# Patient Record
Sex: FEMALE | Race: WHITE | Employment: FULL TIME | URBAN - METROPOLITAN AREA
[De-identification: names, ages, dates, MRNs, and addresses within clinical notes are randomized per-mention and may not be internally consistent; named-entity substitution may affect disease eponyms.]

---

## 2018-09-20 ENCOUNTER — HOSPITAL ENCOUNTER (OUTPATIENT)
Dept: PHYSICAL THERAPY | Age: 28
Discharge: HOME | End: 2018-09-20
Payer: COMMERCIAL

## 2018-09-20 PROCEDURE — 97161 PT EVAL LOW COMPLEX 20 MIN: CPT

## 2018-09-20 PROCEDURE — 97110 THERAPEUTIC EXERCISES: CPT

## 2018-09-20 NOTE — PROGRESS NOTES
[address] [facility] PHYSICAL THERAPY 
   [address], [address] [facility] Phone: [phone]  Fax: [phone] PLAN OF CARE / STATEMENT OF MEDICAL NECESSITY FOR PHYSICAL THERAPY SERVICES Patient Name: Dalia Moran : 1990 Medical  
Diagnosis: R knee Pain Treatment Diagnosis: Right knee pain [M25.561] Onset Date: Chronic Referral Source: Erin Mason MD Start of Care [facility]): 2018 Prior Hospitalization: See medical history Provider #: [id number] Prior Level of Function: No difficulty with walking, standing, working or exercising Comorbidities: None Medications: Verified on Patient Summary List  
The Plan of Care and following information is based on the information from the initial evaluation.  
=========================================================================================== Assessment / key information:  Patient is a 29year old female presenting to therapy with signs and symptoms consistent with R knee patellofemoral pain. Patient states her symptoms began insidiously about 2 months ago while at work. Patient reports she can not recall any specific incident which brought it on, however by the end of her shift noticed an increase in pain. Ever since this time she has been experiencing more soreness with daily activities. Patient did receive an x-ray which she was told showed some problems with her knee cap. Patient reports increased difficulty with walking, standing, work and exercise activities. Patient notes symptoms feel better with rst. Patient rates pain at worst 8/10 and 1/10 at best. 
 Objective Data: Inspection-No significant gait deficits noted. B knee AROM WNL and pain free bilaterally. Strength Testing: Hip ext R 4-/5, L 4-/5; abd R 4-/5, L 4-/5. Bodyweight squat: mechanics WNL with no reproduction of symptoms.  SL squat: increased knee valgus angulation, mild heel rise R>L, mild increase in soreness. SL glute bridge isometric: unable to maintain proper alignment >10 seconds. Flexibility Testing: no restriction to B quadriceps. FOTO: 33/100. Rogers Wang Patient educated on diagnosis, prognosis, POC and HEP. Patient issued copy of HEP and denied additional questions. Patient will benefit from skilled PT in order to address these impairments and functional limitations. =========================================================================================== Eval Complexity: History LOW Complexity : Zero comorbidities / personal factors that will impact the outcome / POC;  Examination  MEDIUM Complexity : 3 Standardized tests and measures addressing body structure, function, activity limitation and / or participation in recreation ; Presentation MEDIUM Complexity : Evolving with changing characteristics ; Decision Making MEDIUM Complexity : FOTO score of 26-74; Overall Complexity LOW Problem List: pain affecting function, decrease strength, decrease ADL/ functional abilitiies and decrease activity tolerance Treatment Plan may include any combination of the following: Therapeutic exercise, Therapeutic activities, Neuromuscular re-education, Physical agent/modality, Manual therapy, Patient education and Functional mobility training Patient / Family readiness to learn indicated by: asking questions, trying to perform skills and interestPersons(s) to be included in education: patient (P) Barriers to Learning/Limitations: no 
Measures taken:   
Patient Goal (s): Eliminate pain Patient self reported health status: good Rehabilitation Potential: good ? Long Term Goals: To be accomplished in  2  weeks: 1. Patient will demonstrate independence with therapy program in order to perform exercises independently and self manage symptoms. 2. Patient will report reduction in pain at worst to 4-5/10 in order to improve standing tolerance. Frequency / Duration:   Patient to be seen  1-2  times per week for 2  weeks: 
Patient / Caregiver education and instruction: self care, activity modification and exercises G-Codes (GP): radha Therapist Signature: Edith Martinez PT Date: 9/20/2018 Certification Period: na Time: 12:45 PM  
=========================================================================================== I certify that the above Physical Therapy Services are being furnished while the patient is under my care. I agree with the treatment plan and certify that this therapy is necessary. Physician Signature:       Date:      Time:  Please sign and return to [facility] [place] or you may fax the signed copy to [phone]. Thank you.

## 2018-09-20 NOTE — PROGRESS NOTES
PHYSICAL THERAPY - DAILY TREATMENT NOTE Patient Name: Dalia Moran        Date: 2018 : 1990   YES Patient  Verified Visit #:   1   of   4  Insurance: Payor: /   
 
In time: 255 Out time:  Total Treatment Time: 25 Medicare Time Tracking (below) Total Timed Codes (min):  na 1:1 Treatment Time:  na  
TREATMENT AREA =  Right knee pain [M25.561] SUBJECTIVE Pain Level (on 0 to 10 scale):  6   10 Medication Changes/New allergies or changes in medical history, any new surgeries or procedures? NO    If yes, update Summary List  
Subjective Functional Status/Changes:  []  No changes reported See POC OBJECTIVE 10 min Therapeutic Exercise:  [x]  See flow sheet Rationale:      increase ROM and increase strength to improve the patients ability to perform walking activities. min Patient Education:  YES  Reviewed HEP []  Progressed/Changed HEP based on: Other Objective/Functional Measures: 
 
See POC Post Treatment Pain Level (on 0 to 10) scale:   6  / 10 ASSESSMENT Assessment/Changes in Function:  
 
See POC []  See Progress Note/Recertification Patient will continue to benefit from skilled PT services to modify and progress therapeutic interventions, address functional mobility deficits, address ROM deficits, address strength deficits, analyze and address soft tissue restrictions, analyze and cue movement patterns, analyze and modify body mechanics/ergonomics and assess and modify postural abnormalities to attain remaining goals. Progress toward goals / Updated goals: 
See POC PLAN [x]  Upgrade activities as tolerated YES Continue plan of care  
[]  Discharge due to :   
[]  Other:   
 
Therapist: Kayla Prescott PT Date: 2018 Time: 12:53 PM  
 
Future Appointments Date Time Provider Yuli Salazar 2018 2:30 PM Kayla Prescott PT [facility]

## 2018-09-24 ENCOUNTER — HOSPITAL ENCOUNTER (OUTPATIENT)
Dept: PHYSICAL THERAPY | Age: 28
Discharge: HOME | End: 2018-09-24
Payer: COMMERCIAL

## 2018-09-24 PROCEDURE — 97110 THERAPEUTIC EXERCISES: CPT

## 2018-09-24 NOTE — PROGRESS NOTES
PHYSICAL THERAPY - DAILY TREATMENT NOTE Patient Name: Laura Kang        Date: 2018 : 1990   YES Patient  Verified Visit #:   2   of   4  Insurance: Payor: Jesus Stearns / Plan: [employer]  [employer]IT[employer] PT / Product Type: Commerical / In time: 242 Out time: 323 Total Treatment Time: 41 Medicare Time Tracking (below) Total Timed Codes (min):  na 1:1 Treatment Time:  na  
TREATMENT AREA =  Right knee pain [M25.561] SUBJECTIVE Pain Level (on 0 to 10 scale):  4  / 10 Medication Changes/New allergies or changes in medical history, any new surgeries or procedures? NO    If yes, update Summary List  
Subjective Functional Status/Changes:  []  No changes reported Patient reports she has not tried her HEP yet, but did go to the gym over the weekend. Patient states her knee cracks all the time throughout the day. OBJECTIVE 41 min Therapeutic Exercise:  [x]  See flow sheet Rationale:      increase ROM, increase strength, improve coordination and increase proprioception to improve the patients ability to perform recreational activities. min Patient Education:  YES  Reviewed HEP []  Progressed/Changed HEP based on: Other Objective/Functional Measures: 
 
Progressed therapy program in order to improve B LE strength, stability and proprioception Patient requiring cuing throughout session in order to maintain proper LE alignment and reduce knee valgus collapse. Post Treatment Pain Level (on 0 to 10) scale:   4  / 10 ASSESSMENT Assessment/Changes in Function:  
 
Patient educated on importance of maintaining consistency with current HEP in order to address symptoms and improve function. Patient states she will be moving out of the start on  and will be unable to return to PT.  
  
[]  See Progress Note/Recertification Patient will continue to benefit from skilled PT services to modify and progress therapeutic interventions, address functional mobility deficits, address ROM deficits, address strength deficits, analyze and address soft tissue restrictions, analyze and cue movement patterns, analyze and modify body mechanics/ergonomics, assess and modify postural abnormalities and address imbalance/dizziness to attain remaining goals. Progress toward goals / Updated goals: 
DC as patient [place] out [place] the [place] PLAN 
[]  Upgrade activities as tolerated No Continue plan of care [x]  Discharge due to : Moving out of state  
[]  Other:   
 
Therapist: Kayla Prescott, PT Date: 9/24/2018 Time: 3:28 PM  
 
No future appointments.

## 2018-12-31 NOTE — PROGRESS NOTES
[address] [place] [address] THERAPY 
[address] [facility] [address],[facility], [address] [facility] - Phone: [phone]  Fax: [phone] DISCHARGE NOTE Patient Name: Yvette Avendano : 1990 Treatment/Medical Diagnosis: Right knee pain [M25.561] Referral Source: Vivek Mason MD    
Date of Initial Visit: 18 Attended Visits: 2 Missed Visits: 3 SUMMARY OF TREATMENT Pt attended only initial evaluation and     1     follow-ups and then did not return. Therefore a formal reassessment of goals was not performed. RECOMMENDATIONS Discontinue physical therapy due to patient not returning. If you have any questions/comments please contact us directly at [phone]. Thank you for allowing us to assist in the care of your patient. Therapist Signature: Peña Yousif PT Date: 18   Time: 7:23 AM